# Patient Record
Sex: MALE | Race: BLACK OR AFRICAN AMERICAN | NOT HISPANIC OR LATINO | Employment: FULL TIME | URBAN - METROPOLITAN AREA
[De-identification: names, ages, dates, MRNs, and addresses within clinical notes are randomized per-mention and may not be internally consistent; named-entity substitution may affect disease eponyms.]

---

## 2023-10-15 PROBLEM — R00.2 PALPITATION: Status: ACTIVE | Noted: 2023-10-15

## 2023-10-15 PROBLEM — R29.818 SUSPECTED SLEEP APNEA: Status: ACTIVE | Noted: 2023-10-15

## 2023-10-15 PROBLEM — R04.0 EPISTAXIS: Status: ACTIVE | Noted: 2023-10-15

## 2023-10-15 PROBLEM — S83.419A GRADE 2 SPRAIN OF MEDIAL COLLATERAL LIGAMENT OF KNEE: Status: ACTIVE | Noted: 2023-10-15

## 2023-10-15 PROBLEM — S83.231D COMPLEX TEAR OF MEDIAL MENISCUS OF RIGHT KNEE, SUBSEQUENT ENCOUNTER: Status: ACTIVE | Noted: 2023-10-15

## 2023-10-15 PROBLEM — R06.83 SNORING: Status: ACTIVE | Noted: 2023-10-15

## 2023-10-15 PROBLEM — M79.89 RIGHT LEG SWELLING: Status: ACTIVE | Noted: 2023-10-15

## 2023-10-15 PROBLEM — M25.461 EFFUSION OF RIGHT KNEE: Status: ACTIVE | Noted: 2023-10-15

## 2023-10-15 PROBLEM — S83.511A RUPTURE OF ANTERIOR CRUCIATE LIGAMENT OF RIGHT KNEE: Status: ACTIVE | Noted: 2023-10-15

## 2023-10-15 PROBLEM — S83.209A CURRENT TEAR OF SEMILUNAR CARTILAGE: Status: ACTIVE | Noted: 2023-10-15

## 2023-10-15 PROBLEM — M76.50 PATELLAR TENDINITIS: Status: ACTIVE | Noted: 2023-10-15

## 2023-10-15 PROBLEM — S89.91XA RIGHT KNEE INJURY: Status: ACTIVE | Noted: 2023-10-15

## 2023-10-15 PROBLEM — R26.2 DIFFICULTY WALKING: Status: ACTIVE | Noted: 2023-10-15

## 2023-10-15 PROBLEM — S83.281A ACUTE LATERAL MENISCUS TEAR OF RIGHT KNEE: Status: ACTIVE | Noted: 2023-10-15

## 2023-10-15 PROBLEM — G47.8 SLEEP PARALYSIS: Status: ACTIVE | Noted: 2023-10-15

## 2023-10-15 PROBLEM — M23.8X1 CREPITUS OF JOINT OF RIGHT KNEE: Status: ACTIVE | Noted: 2023-10-15

## 2023-10-15 RX ORDER — FLUTICASONE PROPIONATE 50 MCG
SPRAY, SUSPENSION (ML) NASAL
COMMUNITY

## 2023-10-17 ENCOUNTER — OFFICE VISIT (OUTPATIENT)
Dept: PRIMARY CARE | Facility: CLINIC | Age: 23
End: 2023-10-17
Payer: COMMERCIAL

## 2023-10-17 VITALS
DIASTOLIC BLOOD PRESSURE: 72 MMHG | HEART RATE: 50 BPM | SYSTOLIC BLOOD PRESSURE: 120 MMHG | BODY MASS INDEX: 28.52 KG/M2 | OXYGEN SATURATION: 100 % | HEIGHT: 74 IN | RESPIRATION RATE: 18 BRPM | TEMPERATURE: 95.9 F | WEIGHT: 222.2 LBS

## 2023-10-17 DIAGNOSIS — Z23 NEED FOR IMMUNIZATION AGAINST INFLUENZA: ICD-10-CM

## 2023-10-17 DIAGNOSIS — E66.3 OVERWEIGHT: Primary | ICD-10-CM

## 2023-10-17 DIAGNOSIS — Z11.3 SCREENING FOR STD (SEXUALLY TRANSMITTED DISEASE): ICD-10-CM

## 2023-10-17 LAB
25(OH)D3 SERPL-MCNC: 26 NG/ML (ref 30–100)
ANION GAP SERPL CALC-SCNC: 10 MMOL/L (ref 10–20)
BUN SERPL-MCNC: 10 MG/DL (ref 6–23)
CALCIUM SERPL-MCNC: 10.3 MG/DL (ref 8.6–10.6)
CHLORIDE SERPL-SCNC: 102 MMOL/L (ref 98–107)
CHOLEST SERPL-MCNC: 209 MG/DL (ref 0–199)
CHOLESTEROL/HDL RATIO: 3.3
CO2 SERPL-SCNC: 31 MMOL/L (ref 21–32)
CREAT SERPL-MCNC: 1.26 MG/DL (ref 0.5–1.3)
EST. AVERAGE GLUCOSE BLD GHB EST-MCNC: 108 MG/DL
GFR SERPL CREATININE-BSD FRML MDRD: 81 ML/MIN/1.73M*2
GLUCOSE SERPL-MCNC: 86 MG/DL (ref 74–99)
HBA1C MFR BLD: 5.4 %
HDLC SERPL-MCNC: 63.4 MG/DL
LDLC SERPL CALC-MCNC: 134 MG/DL
NON HDL CHOLESTEROL: 146 MG/DL (ref 0–149)
POTASSIUM SERPL-SCNC: 4.3 MMOL/L (ref 3.5–5.3)
SODIUM SERPL-SCNC: 139 MMOL/L (ref 136–145)
T PALLIDUM AB SER QL: NONREACTIVE
TRIGL SERPL-MCNC: 59 MG/DL (ref 0–149)
TSH SERPL-ACNC: 1.56 MIU/L (ref 0.44–3.98)
VLDL: 12 MG/DL (ref 0–40)

## 2023-10-17 PROCEDURE — 99213 OFFICE O/P EST LOW 20 MIN: CPT | Performed by: NURSE PRACTITIONER

## 2023-10-17 PROCEDURE — 86780 TREPONEMA PALLIDUM: CPT | Performed by: NURSE PRACTITIONER

## 2023-10-17 PROCEDURE — 87661 TRICHOMONAS VAGINALIS AMPLIF: CPT | Performed by: NURSE PRACTITIONER

## 2023-10-17 PROCEDURE — 99203 OFFICE O/P NEW LOW 30 MIN: CPT | Performed by: NURSE PRACTITIONER

## 2023-10-17 PROCEDURE — 1036F TOBACCO NON-USER: CPT | Performed by: NURSE PRACTITIONER

## 2023-10-17 PROCEDURE — 84443 ASSAY THYROID STIM HORMONE: CPT | Mod: CMCLAB | Performed by: NURSE PRACTITIONER

## 2023-10-17 PROCEDURE — 87800 DETECT AGNT MULT DNA DIREC: CPT | Mod: CMCLAB | Performed by: NURSE PRACTITIONER

## 2023-10-17 PROCEDURE — 80048 BASIC METABOLIC PNL TOTAL CA: CPT | Performed by: NURSE PRACTITIONER

## 2023-10-17 PROCEDURE — 80061 LIPID PANEL: CPT | Performed by: NURSE PRACTITIONER

## 2023-10-17 PROCEDURE — 83036 HEMOGLOBIN GLYCOSYLATED A1C: CPT | Performed by: NURSE PRACTITIONER

## 2023-10-17 PROCEDURE — 87389 HIV-1 AG W/HIV-1&-2 AB AG IA: CPT | Performed by: NURSE PRACTITIONER

## 2023-10-17 PROCEDURE — 90686 IIV4 VACC NO PRSV 0.5 ML IM: CPT | Performed by: NURSE PRACTITIONER

## 2023-10-17 PROCEDURE — 36415 COLL VENOUS BLD VENIPUNCTURE: CPT | Performed by: NURSE PRACTITIONER

## 2023-10-17 PROCEDURE — 82306 VITAMIN D 25 HYDROXY: CPT | Mod: CMCLAB | Performed by: NURSE PRACTITIONER

## 2023-10-17 RX ORDER — IBUPROFEN 600 MG/1
TABLET ORAL
COMMUNITY
Start: 2022-11-03

## 2023-10-17 RX ORDER — OXYCODONE HYDROCHLORIDE 5 MG/1
TABLET ORAL
COMMUNITY
Start: 2022-11-03 | End: 2023-12-12 | Stop reason: ALTCHOICE

## 2023-10-17 RX ORDER — CETIRIZINE HYDROCHLORIDE 1 MG/ML
SOLUTION ORAL
COMMUNITY
Start: 2005-06-24 | End: 2023-12-12 | Stop reason: ALTCHOICE

## 2023-10-17 RX ORDER — BENZOCAINE .13; .15; .5; 2 G/100G; G/100G; G/100G; G/100G
GEL ORAL
COMMUNITY
Start: 2004-05-07 | End: 2023-12-12 | Stop reason: ALTCHOICE

## 2023-10-17 ASSESSMENT — COLUMBIA-SUICIDE SEVERITY RATING SCALE - C-SSRS
6. HAVE YOU EVER DONE ANYTHING, STARTED TO DO ANYTHING, OR PREPARED TO DO ANYTHING TO END YOUR LIFE?: NO
2. HAVE YOU ACTUALLY HAD ANY THOUGHTS OF KILLING YOURSELF?: NO
1. IN THE PAST MONTH, HAVE YOU WISHED YOU WERE DEAD OR WISHED YOU COULD GO TO SLEEP AND NOT WAKE UP?: NO

## 2023-10-17 ASSESSMENT — PATIENT HEALTH QUESTIONNAIRE - PHQ9
2. FEELING DOWN, DEPRESSED OR HOPELESS: NOT AT ALL
SUM OF ALL RESPONSES TO PHQ9 QUESTIONS 1 AND 2: 0
1. LITTLE INTEREST OR PLEASURE IN DOING THINGS: NOT AT ALL

## 2023-10-17 ASSESSMENT — ENCOUNTER SYMPTOMS
LOSS OF SENSATION IN FEET: 0
DEPRESSION: 0
OCCASIONAL FEELINGS OF UNSTEADINESS: 0

## 2023-10-17 ASSESSMENT — PAIN SCALES - GENERAL: PAINLEVEL: 2

## 2023-10-17 NOTE — PATIENT INSTRUCTIONS
Follow up in 9-12 months or sooner if needed.     Focus on the basics!   Aim to get adequate sleep.   Drink half your body weight in ounces of water daily.   I placed an order for nutrition to discuss diet plans. Please call the number provided for scheduling.     Today we completed blood work. We will contact you with any abnormalities from this testing.    Today, you received you flu shot.   You may experience mild redness and swelling at the injection site.       Call us with any questions or concerns.

## 2023-10-17 NOTE — PROGRESS NOTES
"Subjective   Gary Shultz is a 23 y.o. male who presents for Follow-up.  HPI  Mr. Shultz is a 24 yo M here today for re-establishment of care. Last seen in 2018.   States that he has been doing well generally. He is here today for a general health check up.   Reports that he is interest in optimizing his health and his weight. Current weight is 222, and he reports his goal weight around 200. He exercises frequently. He has not been able to lose weight and is seeking specific dietary guidance/ meal planning for weight loss and health.   Notes a family history of diabetes and is requesting screening today. Denies polydipsia, polyuria, headache, blurred vision, or lightheadedness  UTD with eye exam, last less than 1 year ago.   Would like to receive his flu vaccine today.   Notes some mild chronic low back pain exacerbations as well as intermittent RLQ pains. He does not take medication for discomfort. He is unsure about possible association with bowel movements. Typically sleeps on his stomach.       All systems reviewed. Review of systems negative except for noted positives in HPI    Objective     /72 (BP Location: Right arm, Patient Position: Sitting, BP Cuff Size: Adult)   Pulse 50   Temp 35.5 °C (95.9 °F)   Resp 18   Ht 1.88 m (6' 2\")   Wt 101 kg (222 lb 3.2 oz)   SpO2 100%   BMI 28.53 kg/m²    Vital signs noted and reviewed.       Physical Exam  Constitutional:       Appearance: Normal appearance.   Cardiovascular:      Rate and Rhythm: Normal rate and regular rhythm.   Pulmonary:      Effort: Pulmonary effort is normal. No respiratory distress.      Breath sounds: Normal breath sounds.   Skin:     General: Skin is warm and dry.   Neurological:      Mental Status: He is oriented to person, place, and time.   Psychiatric:         Mood and Affect: Mood normal.             Assessment/Plan   Problem List Items Addressed This Visit    None  Visit Diagnoses       Overweight    -  Primary    Relevant Orders "    Basic Metabolic Panel    Hemoglobin A1C    Lipid Panel    Vitamin D 25-Hydroxy,Total (for eval of Vitamin D levels)    TSH with reflex to Free T4 if abnormal    Referral to Nutrition Services    Screening for STD (sexually transmitted disease)        Relevant Orders    Syphilis Screen with Reflex    C. Trachomatis / N. Gonorrhoeae, Amplified Detection    TRICH VAGINALIS, AMPLIFIED    HIV 1/2 Antigen/Antibody Screen with Reflex to Confirmation    Need for immunization against influenza        Relevant Orders    Flu vaccine (IIV4) age 6 months and greater, preservative free

## 2023-10-18 LAB
C TRACH RRNA SPEC QL NAA+PROBE: NEGATIVE
HIV 1+2 AB+HIV1 P24 AG SERPL QL IA: NONREACTIVE
N GONORRHOEA DNA SPEC QL PROBE+SIG AMP: NEGATIVE
T VAGINALIS RRNA SPEC QL NAA+PROBE: NEGATIVE

## 2023-12-12 ENCOUNTER — APPOINTMENT (OUTPATIENT)
Dept: PRIMARY CARE | Facility: CLINIC | Age: 23
End: 2023-12-12

## 2023-12-12 ENCOUNTER — TELEMEDICINE (OUTPATIENT)
Dept: PRIMARY CARE | Facility: CLINIC | Age: 23
End: 2023-12-12
Payer: COMMERCIAL

## 2023-12-12 DIAGNOSIS — R35.0 FREQUENCY OF URINATION: Primary | ICD-10-CM

## 2023-12-12 PROCEDURE — 99212 OFFICE O/P EST SF 10 MIN: CPT | Performed by: FAMILY MEDICINE

## 2023-12-12 NOTE — PROGRESS NOTES
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--no ST-- no N,V,D--  Disoriented on Friday driving and went to  and had a (-) COVID test-also tested for GC/CT/trich and blood test-- all (-)--Also had STD check on 10/17/23 and it was all (-)--has been exclusive with GF for past 8 years    GF was diagnosed with COVID a week ago Sunday-- bad M-F then improved over weekend--he did not see her until yesterday-- she felt bad M-F then improved over weekend--he did not see her until yesterday--     VAXXED-- and 2 booster--    No pelvic pain or perineal pain or rectal pain--no h/o prostatitis  No abdominal pain  No TTP over bladder--     Fhx-- uncle had prostate CA--    ALL OTHER ROS (-)    General appearance:  Vitals available from patient?No    Alert, oriented, pleasant, in no apparent distress Yes  Answers questions appropriatelyYes  Eyes clear?No    Throat exam Not Available    Is patient in respiratory distressNo  Is patient coughing during consult:No  Audible wheezing noted? :No    Pt sounds congested?: No    Abdominal TTP by pt exam?:No    Psychiatric: Affect normalYes    Other relevant physical exam:    Pt location in [place] and consent obtained. Telemedicine appropriate evaluation completed. Appropriate physical exam done.    Urgency, dysuria, sl pelvic pain-- subjective fever, bad HA--- (-) COVID tests but + COVID exposure  Discussed that COVID has been known to cause LUTS as he is experiencing-- possible false (-) tests--  Will check urinalysis and urine culture--   Follow up with urologist if symptoms persist--

## 2023-12-14 ENCOUNTER — LAB (OUTPATIENT)
Dept: LAB | Facility: LAB | Age: 23
End: 2023-12-14
Payer: COMMERCIAL

## 2023-12-14 DIAGNOSIS — R35.0 FREQUENCY OF URINATION: ICD-10-CM

## 2023-12-14 LAB
APPEARANCE UR: CLEAR
BILIRUB UR STRIP.AUTO-MCNC: NEGATIVE MG/DL
COLOR UR: YELLOW
GLUCOSE UR STRIP.AUTO-MCNC: NEGATIVE MG/DL
KETONES UR STRIP.AUTO-MCNC: NEGATIVE MG/DL
LEUKOCYTE ESTERASE UR QL STRIP.AUTO: ABNORMAL
MUCOUS THREADS #/AREA URNS AUTO: ABNORMAL /LPF
NITRITE UR QL STRIP.AUTO: NEGATIVE
PH UR STRIP.AUTO: 7 [PH]
PROT UR STRIP.AUTO-MCNC: NEGATIVE MG/DL
RBC # UR STRIP.AUTO: NEGATIVE /UL
RBC #/AREA URNS AUTO: ABNORMAL /HPF
SP GR UR STRIP.AUTO: 1.02
UROBILINOGEN UR STRIP.AUTO-MCNC: 2 MG/DL
WBC #/AREA URNS AUTO: ABNORMAL /HPF

## 2023-12-14 PROCEDURE — 87086 URINE CULTURE/COLONY COUNT: CPT

## 2023-12-14 PROCEDURE — 81001 URINALYSIS AUTO W/SCOPE: CPT

## 2023-12-15 LAB — BACTERIA UR CULT: NO GROWTH

## 2023-12-17 NOTE — PATIENT INSTRUCTIONS
Please send me a [employer] message if you have any questions or concerns.  FOR NON URGENT questions only.  Allow up to 72 hours for response.    If you have prescription issues or other questions you can email   Edyta Butler  Digital Health Coordinator, at   [email or web address]     Urgency, dysuria, sl pelvic pain-- subjective fever, bad HA--- (-) COVID tests but + COVID exposure  Discussed that COVID has been known to cause LUTS as he is experiencing-- possible false (-) tests--  Will check urinalysis and urine culture--   Follow up with urologist if symptoms persist--     Rest and drink plenty of fluids    Tylenol and or motrin as needed for pain and fever (unless you have been told not to take these because of your personal medical history)    Discussed options and precautions:   Viral versus bacterial infection; use of medications; possible side effects; appropriate over-the-counter medications; possible complications and /or when to follow-up.    Follow-up in 1 to 2 days if not improving.  Follow-up immediately if symptoms worsen.    All red flags requiring in person care were discussed.  All patient's questions were answered.    Limitations to telemedicine include inability to do a complete and accurate physical exam.  Any concerns regarding this were conveyed with the patient and in person follow-up recommended if patient nature of illness does not progress as anticipated during this visit.

## 2023-12-19 ENCOUNTER — OFFICE VISIT (OUTPATIENT)
Dept: PRIMARY CARE | Facility: CLINIC | Age: 23
End: 2023-12-19
Payer: COMMERCIAL

## 2023-12-19 VITALS
TEMPERATURE: 97 F | HEIGHT: 75 IN | SYSTOLIC BLOOD PRESSURE: 113 MMHG | HEART RATE: 68 BPM | DIASTOLIC BLOOD PRESSURE: 72 MMHG | OXYGEN SATURATION: 98 % | BODY MASS INDEX: 27.35 KG/M2 | WEIGHT: 220 LBS | RESPIRATION RATE: 16 BRPM

## 2023-12-19 DIAGNOSIS — M79.10 MUSCLE TENSION PAIN: Primary | ICD-10-CM

## 2023-12-19 DIAGNOSIS — M79.10 MUSCLE TENSION PAIN: ICD-10-CM

## 2023-12-19 DIAGNOSIS — E55.9 VITAMIN D INSUFFICIENCY: ICD-10-CM

## 2023-12-19 PROCEDURE — 99214 OFFICE O/P EST MOD 30 MIN: CPT | Performed by: NURSE PRACTITIONER

## 2023-12-19 PROCEDURE — 1036F TOBACCO NON-USER: CPT | Performed by: NURSE PRACTITIONER

## 2023-12-19 RX ORDER — CHOLECALCIFEROL (VITAMIN D3) 50 MCG
50 TABLET ORAL DAILY
Qty: 30 TABLET | Refills: 11 | Status: SHIPPED | OUTPATIENT
Start: 2023-12-19 | End: 2023-12-19 | Stop reason: ENTERED-IN-ERROR

## 2023-12-19 RX ORDER — CYCLOBENZAPRINE HCL 5 MG
5 TABLET ORAL NIGHTLY PRN
Qty: 30 TABLET | Refills: 0 | Status: CANCELLED | OUTPATIENT
Start: 2023-12-19 | End: 2024-02-17

## 2023-12-19 RX ORDER — FLUTICASONE PROPIONATE 50 MCG
SPRAY, SUSPENSION (ML) NASAL
Qty: 16 G | Status: CANCELLED | OUTPATIENT
Start: 2023-12-19

## 2023-12-19 RX ORDER — CYCLOBENZAPRINE HCL 5 MG
5 TABLET ORAL NIGHTLY PRN
Qty: 30 TABLET | Refills: 0 | Status: SHIPPED | OUTPATIENT
Start: 2023-12-19 | End: 2023-12-19 | Stop reason: ENTERED-IN-ERROR

## 2023-12-19 ASSESSMENT — PAIN SCALES - GENERAL: PAINLEVEL: 5

## 2023-12-19 NOTE — PATIENT INSTRUCTIONS
Thank you for coming in for your visit today!    Please follow up in  12 weeks     START daily vitamin D supplementation.     Take Naprosyn every 12 hours around the clock to decrease inflammation.  Take the muscle relaxer to decrease spasms. This medication may cause drowsiness. Please do not operate machinery while taking this medication.    STRETCH!   Use heat and apply muscle rub prior to stretching.   Apply ice after stretching and while resting to decrease inflammation.   Soak in 2-3 cups of Epsom salt in the bathtub.      Call 911 or go to the emergency room if you have pain in your chest, difficulty breathing, or other life threatening symptoms.

## 2023-12-19 NOTE — PROGRESS NOTES
"Subjective   Gary hSultz is a 23 y.o. male who presents for Follow-up.  HPI  Mr. Shultz is here today with concern for recent headaches. Notes that over the last several weeks he has been experiencing an increase in headaches. He does note resolution when he takes Tylenol. Denies other associated symptoms like visual disturbances or sensitivity to sound. He does endorse bilateral trapezius tension. Denies eye strain. Does not feel the headache is associated with sleep patterns or eating.       Mild burning with urination. Recently seen in [facility] care. Urine culture negative.  Notes that it is improving. Would like to see urologist    Energy level is lower than he \"would like them to be\"   Notes that it can be worse after lunch.   Denies known pattern. Mildly insufficient vitamin D level noted. Open to trial of supplementation with close monitoring of patterns or associated factors.     All systems reviewed. Review of systems negative except for noted positives in HPI    Objective     /72   Pulse 68   Temp 36.1 °C (97 °F)   Resp 16   Ht 1.905 m (6' 3\")   Wt 99.8 kg (220 lb)   SpO2 98%   BMI 27.50 kg/m²    Vital signs noted and reviewed.       Physical Exam  Constitutional:       Appearance: Normal appearance.   Cardiovascular:      Rate and Rhythm: Normal rate and regular rhythm.   Pulmonary:      Effort: Pulmonary effort is normal. No respiratory distress.      Breath sounds: Normal breath sounds.   Musculoskeletal:      Cervical back: Swelling and tenderness present.   Skin:     General: Skin is warm and dry.   Neurological:      Mental Status: He is oriented to person, place, and time.   Psychiatric:         Mood and Affect: Mood normal.             Assessment/Plan   Problem List Items Addressed This Visit    None  Visit Diagnoses       Muscle tension pain    -  Primary    Vitamin D insufficiency                        " no

## 2023-12-21 ENCOUNTER — OFFICE VISIT (OUTPATIENT)
Dept: UROLOGY | Facility: CLINIC | Age: 23
End: 2023-12-21
Payer: COMMERCIAL

## 2023-12-21 VITALS
SYSTOLIC BLOOD PRESSURE: 111 MMHG | DIASTOLIC BLOOD PRESSURE: 67 MMHG | HEART RATE: 59 BPM | TEMPERATURE: 97.9 F | WEIGHT: 216.2 LBS | RESPIRATION RATE: 18 BRPM | BODY MASS INDEX: 27.02 KG/M2 | OXYGEN SATURATION: 99 %

## 2023-12-21 DIAGNOSIS — E55.9 VITAMIN D INSUFFICIENCY: ICD-10-CM

## 2023-12-21 DIAGNOSIS — R50.9 FEVER, UNSPECIFIED FEVER CAUSE: ICD-10-CM

## 2023-12-21 DIAGNOSIS — R35.0 URINARY FREQUENCY: Primary | ICD-10-CM

## 2023-12-21 DIAGNOSIS — M79.10 MUSCLE TENSION PAIN: ICD-10-CM

## 2023-12-21 DIAGNOSIS — R10.9 FLANK PAIN: ICD-10-CM

## 2023-12-21 PROBLEM — R39.15 URINARY URGENCY: Status: ACTIVE | Noted: 2023-12-21

## 2023-12-21 PROBLEM — R30.0 DYSURIA: Status: ACTIVE | Noted: 2023-12-21

## 2023-12-21 PROBLEM — R33.9 INCOMPLETE BLADDER EMPTYING: Status: ACTIVE | Noted: 2023-12-21

## 2023-12-21 LAB
POC APPEARANCE, URINE: CLEAR
POC BILIRUBIN, URINE: NEGATIVE
POC BLOOD, URINE: NEGATIVE
POC COLOR, URINE: YELLOW
POC GLUCOSE, URINE: NEGATIVE MG/DL
POC KETONES, URINE: NEGATIVE MG/DL
POC LEUKOCYTES, URINE: ABNORMAL
POC NITRITE,URINE: NEGATIVE
POC PH, URINE: 7.5 PH
POC PROTEIN, URINE: NEGATIVE MG/DL
POC SPECIFIC GRAVITY, URINE: 1.02
POC UROBILINOGEN, URINE: 1 EU/DL

## 2023-12-21 PROCEDURE — 51798 US URINE CAPACITY MEASURE: CPT | Mod: ZK | Performed by: PHYSICIAN ASSISTANT

## 2023-12-21 PROCEDURE — 81003 URINALYSIS AUTO W/O SCOPE: CPT | Mod: ZK | Performed by: PHYSICIAN ASSISTANT

## 2023-12-21 PROCEDURE — 1036F TOBACCO NON-USER: CPT | Performed by: PHYSICIAN ASSISTANT

## 2023-12-21 PROCEDURE — 99214 OFFICE O/P EST MOD 30 MIN: CPT | Mod: ZK | Performed by: PHYSICIAN ASSISTANT

## 2023-12-21 PROCEDURE — 51798 US URINE CAPACITY MEASURE: CPT | Performed by: PHYSICIAN ASSISTANT

## 2023-12-21 PROCEDURE — 81003 URINALYSIS AUTO W/O SCOPE: CPT | Performed by: PHYSICIAN ASSISTANT

## 2023-12-21 PROCEDURE — 99214 OFFICE O/P EST MOD 30 MIN: CPT | Performed by: PHYSICIAN ASSISTANT

## 2023-12-21 PROCEDURE — 87086 URINE CULTURE/COLONY COUNT: CPT | Performed by: PHYSICIAN ASSISTANT

## 2023-12-21 RX ORDER — CHOLECALCIFEROL (VITAMIN D3) 50 MCG
50 TABLET ORAL DAILY
Qty: 30 TABLET | Refills: 11 | Status: SHIPPED | OUTPATIENT
Start: 2023-12-21 | End: 2024-12-20

## 2023-12-21 RX ORDER — CYCLOBENZAPRINE HCL 5 MG
5 TABLET ORAL NIGHTLY PRN
Qty: 30 TABLET | Refills: 0 | Status: SHIPPED | OUTPATIENT
Start: 2023-12-21 | End: 2024-02-19

## 2023-12-21 SDOH — ECONOMIC STABILITY: FOOD INSECURITY: WITHIN THE PAST 12 MONTHS, THE FOOD YOU BOUGHT JUST DIDN'T LAST AND YOU DIDN'T HAVE MONEY TO GET MORE.: NEVER TRUE

## 2023-12-21 SDOH — ECONOMIC STABILITY: FOOD INSECURITY: WITHIN THE PAST 12 MONTHS, YOU WORRIED THAT YOUR FOOD WOULD RUN OUT BEFORE YOU GOT MONEY TO BUY MORE.: NEVER TRUE

## 2023-12-21 ASSESSMENT — PAIN SCALES - GENERAL: PAINLEVEL: 0-NO PAIN

## 2023-12-21 ASSESSMENT — ENCOUNTER SYMPTOMS
ALLERGIC/IMMUNOLOGIC NEGATIVE: 1
LOSS OF SENSATION IN FEET: 0
DEPRESSION: 0
RESPIRATORY NEGATIVE: 1
NEUROLOGICAL NEGATIVE: 1
PSYCHIATRIC NEGATIVE: 1
HEMATOLOGIC/LYMPHATIC NEGATIVE: 1
EYES NEGATIVE: 1
CONSTITUTIONAL NEGATIVE: 1
ENDOCRINE NEGATIVE: 1
GASTROINTESTINAL NEGATIVE: 1
MUSCULOSKELETAL NEGATIVE: 1
CARDIOVASCULAR NEGATIVE: 1
OCCASIONAL FEELINGS OF UNSTEADINESS: 0

## 2023-12-21 NOTE — PROGRESS NOTES
Subjective   Patient ID: Gary Shultz is a 23 y.o. male who presents for No chief complaint on file..  ALISA    Pt is a 24 yo male with new onset dysuria, suprapubic pain, right flank pain radiating to right lower abdomen, increased urinary frequency, urgency, intermittent fevers with sweats and headaches.   He reports the burning has subsided over the last few days.   He had a urine culture and STD evaluation and was negative.     He denies gross hematuria. He denies nausea or vomiting. He denies penile discharge or peritoneal pain.     UA trace of leuks        Review of Systems   Constitutional: Negative.    HENT: Negative.     Eyes: Negative.    Respiratory: Negative.     Cardiovascular: Negative.    Gastrointestinal: Negative.    Endocrine: Negative.    Genitourinary: Negative.    Musculoskeletal: Negative.    Skin: Negative.    Allergic/Immunologic: Negative.    Neurological: Negative.    Hematological: Negative.    Psychiatric/Behavioral: Negative.         Objective   Physical Exam  Constitutional:       General: He is not in acute distress.     Appearance: Normal appearance.   HENT:      Head: Normocephalic and atraumatic.      Nose: Nose normal.      Mouth/Throat:      Mouth: Mucous membranes are moist.   Cardiovascular:      Rate and Rhythm: Normal rate.   Pulmonary:      Effort: Pulmonary effort is normal.   Abdominal:      General: Abdomen is flat.      Palpations: Abdomen is soft.   Genitourinary:     Penis: Normal.       Testes: Normal.      Prostate: Normal.   Musculoskeletal:      Cervical back: Normal range of motion.   Neurological:      Mental Status: He is alert.         Assessment/Plan   Problem List Items Addressed This Visit             ICD-10-CM    Flank pain R10.9    Relevant Orders    CT abdomen pelvis wo IV contrast    Urinary frequency - Primary R35.0    Relevant Orders    POCT UA (nonautomated) manually resulted (Completed)    CT abdomen pelvis wo IV contrast     Other Visit Diagnoses          Codes    Fever, unspecified fever cause     R50.9    Relevant Orders    CT abdomen pelvis wo IV contrast             Urine sent for culture. I advised proceeding with a CT stone protocole to evaluate for ureteral stone especially since he has been having intermittent flank pain and fevers.     Follow up in 3-4 weeks.     Corwin Carvalho PA-C 12/21/23 10:57 AM

## 2023-12-22 LAB — BACTERIA UR CULT: NO GROWTH

## 2023-12-26 ENCOUNTER — NUTRITION (OUTPATIENT)
Dept: NUTRITION | Facility: HOSPITAL | Age: 23
End: 2023-12-26
Payer: COMMERCIAL

## 2023-12-26 DIAGNOSIS — Z71.3 DIETARY COUNSELING: Primary | ICD-10-CM

## 2023-12-26 DIAGNOSIS — E66.3 OVERWEIGHT: ICD-10-CM

## 2023-12-26 PROCEDURE — 97802 MEDICAL NUTRITION INDIV IN: CPT | Performed by: DIETITIAN, REGISTERED

## 2023-12-26 NOTE — PROGRESS NOTES
Reason for Nutrition Visit:  Pt is a 23 y.o. male being seen at [facility] referred for   1. Dietary counseling        2. Overweight  Referral to Nutrition Services         Nutrition Assessment      Past Medical Hx:  Patient Active Problem List   Diagnosis    Acute lateral meniscus tear of right knee    Crepitus of joint of right knee    Difficulty walking    Epistaxis    Grade 2 sprain of medial collateral ligament of knee    Palpitation    Patellar tendinitis    Current tear of semilunar cartilage    Effusion of right knee    Right knee injury    Rupture of anterior cruciate ligament of right knee    Right leg swelling    Sleep paralysis    Snoring    Suspected sleep apnea    Complex tear of medial meniscus of right knee, subsequent encounter    Flank pain    Dysuria    Urinary frequency    Urinary urgency    Incomplete bladder emptying    Overweight     Lab Results   Component Value Date    HGBA1C 5.4 10/17/2023    CHOL 209 (H) 10/17/2023    LDLCALC 134 (H) 10/17/2023    TRIG 59 10/17/2023    HDL 63.4 10/17/2023      Food and Nutrient History: Pt says that he is concerned about gut health with bloating. He can bloat usually after eating or even if he hasn't ate in several hours will notice it. He only eats about 2-3 meals per day, but reports that they can be big portions and wants to reduce these. He interested in losing weight. Both of his parents have DM and HTN. His cholesterol was slightly elevated. He does eat breakfast. normally a breakfast sandwich with sausage and hall, and half of the time eats lunch at work. Dinner is consistent. He snacks most of the day because they are available at work. 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 He tells that his energy drops multiple times per day like 11-12pm and 3pm.     Dietary Recall:  Meal 1: 8am 3-4 slices on white bread sometimes with an apple  Meal 2: Rebel-bowl with vegetables-broccoli sprouts, veggie  mixes, yum yum sauce, chicken, rice  Meal 3: 7:30-9pm few pcs of pizza; sub sandwich; chicken tenders and fries; dinner at home might be chicken and rice  Snacks: granola bars, chips, cookies, donuts at work; chews Extra gum regularly throughout the day  Fluid Intake: 120 oz water, lemonade 3x per week, 1 cup of coffee sometimes, 1/2 lori mixer bottle maybe 1-2 per weekends    Appetite: Good     Food Allergy: n/a  Food Intolerance: possible lactose sensivity  GI Symptoms: increased gas, constipation, reflux GI Symptoms greater than 2 weeks: yes  Oral Problems: denies  Dentition: own  Sleep Duration/Quality: 5-6 hrs disrupted, 7+ hrs disrupted  Cooking: Patient, Family  Grocery Shopping: Patient, Family  Dining Out: Everyday    Vitamin Intake: D   Nutrition-Related Complementary/Alternative Medicine Use: Seamoss gel-black seed oil    Physical Activity History: Works out with strength, cardio and plays basketball/drills at least 2x per week; works for the [employer] and uses their gym     Food Preparation  Cooking: Patient, Family  Grocery Shopping: Patient, Family  Dining Out: Everyday    Feelings of Hunger?: Yes and will eat  Physical Feeling: Stuffed  Binging: Frequently  Cravings: Sweet  Energy Levels: Low    Nutrition Focused Physical Exam:    Performed/Deferred: Deferred as pt visually appears well-nourished with no signs of malnutrition    Muscle Wasting:  Temporalis: Defer  Pectoralis (Clavicular Region): Defer  Deltoid/Trapezius: Defer  Interosseous: Defer  Quadriceps: Defer    Loss of Subcutaneous Fat:  Orbital Fat Pads: Defer  Buccal Fat Pads: Defer  Triceps: Defer  Ribs: Defer    Other Physical Findings:  Hair: Negative  Eyes: Negative  Mouth: Negative  Skin: Negative  Nails: Negative    Estimated Energy Needs:    Total Energy Estimated Needs (kCal): 2210 kCal   Method for Estimating Needs: MSJ: 2050x1.2-250   Total Protein Estimated Needs (g): 98.18 g   Total Protein Estimated Needs (g/kg): 1  g/kg  Nutrition Diagnosis     Patient has Nutrition Diagnosis: Yes Diagnosis Status (1): New  Nutrition Diagnosis 1: Altered GI function Related to (1): alteration in GI tract function As Evidenced by (1): reports of excessive bloating, gas and occasional constipation.     Diagnosis Status (2): New Nutrition Diagnosis 2: Food and nutrition related knowledge deficit  Nutrition Diagnosis 2: Food and nutrition related knowledge deficit Related to (2): lack of or limited prior nutrition-related education As Evidenced by (2): no prior knowledge of need for food- and nutrition-related recommendations.     Diagnosis Status (3): New Nutrition Diagnosis 3: Inadequate fiber intake  Nutrition Diagnosis 3: Inadequate fiber intake Related to (3): food and nutrition related knowledge deficit concerning desirable quantities of fiber As Evidenced by (3): estimated intake of fiber that is insufficient when compared to recommended amounts (38 g/day for men and 25 g/day for women).     Nutrition Interventions/Recommendations   Individualized Nutrition Prescription Provided for : Low FODMAP diet  Meals & Snacks: Fiber-modified diet, Modify Composition of Meals/Snacks, Modify schedule of foods/fluids  Goals: Consistent meal/snack pattern with adherance to the low FODMAP diet and modifications to eating times throughout the day    Strategies: Nutrition counseling based on motivational interviewing strategy, Nutrition counseling based on goal setting strategy    Nutrition Monitoring and Evaluation   Monitoring and Evaluation Plan: Meal/snack pattern, Protein intake Meal/Snack Pattern: Estimated meal and snack pattern, Food variety  Monitoring and Evaluation Plan: Food and nutrition knowledge Criteria: Ability to choose healthful foods that increase satiety and physical fullness  Monitoring and Evaluation Plan: GI profile        Nutrition Recommendations:  Via teach back method patient verbalized understanding of the following topics:  1)  Recommended pt try the low FODMAP diet to reduce symptoms of bloating, gas and constipation. After following for 4-6 weeks, pt will complete the reintroduction period to identify if specific FODMAPs contribute to symptoms the most. Once that period is completed, we can discuss foods to enhance gut health for better tolerance of all foods without GI symptoms.     Educational Handouts: Low FODMAP Toolkit    Readiness to Change : Fair  Level of Understanding : Fair  Anticipated Compliant : Fair

## 2023-12-27 VITALS — HEIGHT: 75 IN | BODY MASS INDEX: 27.02 KG/M2

## 2023-12-27 NOTE — PATIENT INSTRUCTIONS
1) FODMAPs stands for fermentable, oligosaccharides, disaccharides, monosaccharides and polyols. These are fibers and sugars found in foods and are often found to cause GI discomfort including bloating, abdominal pain, diarrhea and constipation. When following a low FODMAPs diet you will be eliminating these foods from the diet for about 4 to 6 weeks. After eating low FODMAPs, you will slowly introduce each food or re-challenge the food to see if it cause your GI symptoms.  2) Fructose is found in honey and high fructose corn syrup. Fruit contains fructose and some varieties have higher amounts including apples, pears, cherries, peaches, prunes, apricots, mangoes, watermelon, and blackberries. Fruit can still be consumed with low FODMAPs in small portions such as ½ small banana, one small jamel, or ½ berries or melon. Table sugar, 100% maple syrup and stevia can be used as sweeteners.  3) Oligosaccharides refer to wheat or rye grain products, onions, garlic, ketchup, raisins, beans, nuts, seeds, and certain seasonings. Gluten-free grain products can be consumed. Small portions or 2 tablespoons of nuts and nut butters can be tolerated. Garlic-infused oils, chives, scallions, leafy herbs, nash, mustard and sweet spices can be used. Chickpeas, green beans and edamame may be eaten in ½ cup portions.  4) Disaccharides include lactose, which is a sugar naturally found in milk. Regular milk products including yogurt and ice cream should not be eaten. Lactose-free, plant based or kefir milk products should be used instead. Soft cheeses like cottage or ricotta cheese will have higher amounts of lactose. Aged cheeses including cheddar, swiss, Rj cameron, brie, parmesan, and Havarti are low FODMAP.   5) Polyols are fibers found in certain vegetables and sugar alcohols. Broccoli, cauliflower, Brussel sprouts, mushrooms, corn, asparagus, beets and celery should be avoided. Leafy green vegetables, tomatoes, cucumbers,  summer squash, carrots, bell peppers, and white potatoes do not have polyols. Sugar free gums and candies that use sugar alcohols (i.e. mannitol, xylitol, and maltitol) cannot be used.  6) After following low FODMAPs for 4 to 6 weeks, you will introduce one food at a time. On this first day choose to eat a small portion of a food from one of the FODMAP groups. The next day eat several large portions of foods from this group. An example would be start with  oligosaccharides and have 1 slice of wheat bread. Then gradually eat more  oligosaccharides foods over the next week several times a day. Track your GI symptoms to assess if  oligosaccharides is a trigger food. Once you have tried that food for a week, return to eating low FODMAPs  diet for a few days then try challenging another FODMAP group.

## 2024-01-04 ENCOUNTER — HOSPITAL ENCOUNTER (OUTPATIENT)
Dept: RADIOLOGY | Facility: HOSPITAL | Age: 24
Discharge: HOME | End: 2024-01-04
Payer: COMMERCIAL

## 2024-01-04 DIAGNOSIS — R50.9 FEVER, UNSPECIFIED FEVER CAUSE: ICD-10-CM

## 2024-01-04 DIAGNOSIS — R35.0 URINARY FREQUENCY: ICD-10-CM

## 2024-01-04 DIAGNOSIS — R10.9 FLANK PAIN: ICD-10-CM

## 2024-01-04 PROCEDURE — 74176 CT ABD & PELVIS W/O CONTRAST: CPT

## 2024-01-04 PROCEDURE — 74176 CT ABD & PELVIS W/O CONTRAST: CPT | Performed by: RADIOLOGY

## 2024-01-22 ENCOUNTER — APPOINTMENT (OUTPATIENT)
Dept: UROLOGY | Facility: CLINIC | Age: 24
End: 2024-01-22
Payer: COMMERCIAL

## 2024-01-25 ENCOUNTER — APPOINTMENT (OUTPATIENT)
Dept: UROLOGY | Facility: CLINIC | Age: 24
End: 2024-01-25
Payer: COMMERCIAL

## 2024-02-13 ENCOUNTER — APPOINTMENT (OUTPATIENT)
Dept: NUTRITION | Facility: HOSPITAL | Age: 24
End: 2024-02-13
Payer: COMMERCIAL

## 2024-03-04 ENCOUNTER — APPOINTMENT (OUTPATIENT)
Dept: RADIOLOGY | Facility: HOSPITAL | Age: 24
End: 2024-03-04
Payer: COMMERCIAL

## 2024-03-04 ENCOUNTER — HOSPITAL ENCOUNTER (EMERGENCY)
Facility: HOSPITAL | Age: 24
Discharge: HOME | End: 2024-03-04
Payer: COMMERCIAL

## 2024-03-04 VITALS
OXYGEN SATURATION: 99 % | SYSTOLIC BLOOD PRESSURE: 120 MMHG | DIASTOLIC BLOOD PRESSURE: 61 MMHG | WEIGHT: 215 LBS | RESPIRATION RATE: 18 BRPM | HEIGHT: 75 IN | TEMPERATURE: 99.3 F | HEART RATE: 84 BPM | BODY MASS INDEX: 26.73 KG/M2

## 2024-03-04 DIAGNOSIS — S83.92XA SPRAIN OF LEFT KNEE, UNSPECIFIED LIGAMENT, INITIAL ENCOUNTER: Primary | ICD-10-CM

## 2024-03-04 PROCEDURE — 73590 X-RAY EXAM OF LOWER LEG: CPT | Mod: LT

## 2024-03-04 PROCEDURE — 73590 X-RAY EXAM OF LOWER LEG: CPT | Mod: LEFT SIDE | Performed by: RADIOLOGY

## 2024-03-04 PROCEDURE — 73564 X-RAY EXAM KNEE 4 OR MORE: CPT | Mod: LT

## 2024-03-04 PROCEDURE — 99284 EMERGENCY DEPT VISIT MOD MDM: CPT | Performed by: PHYSICIAN ASSISTANT

## 2024-03-04 PROCEDURE — 99284 EMERGENCY DEPT VISIT MOD MDM: CPT

## 2024-03-04 PROCEDURE — 73564 X-RAY EXAM KNEE 4 OR MORE: CPT | Mod: LEFT SIDE | Performed by: RADIOLOGY

## 2024-03-04 RX ORDER — IBUPROFEN 600 MG/1
600 TABLET ORAL EVERY 6 HOURS PRN
Qty: 30 TABLET | Refills: 0 | Status: SHIPPED | OUTPATIENT
Start: 2024-03-04

## 2024-03-04 RX ORDER — ACETAMINOPHEN 325 MG/1
650 TABLET ORAL EVERY 6 HOURS PRN
Qty: 30 TABLET | Refills: 0 | Status: SHIPPED | OUTPATIENT
Start: 2024-03-04

## 2024-03-04 ASSESSMENT — PAIN DESCRIPTION - ORIENTATION: ORIENTATION: RIGHT

## 2024-03-04 ASSESSMENT — PAIN DESCRIPTION - PAIN TYPE: TYPE: ACUTE PAIN

## 2024-03-04 ASSESSMENT — COLUMBIA-SUICIDE SEVERITY RATING SCALE - C-SSRS
2. HAVE YOU ACTUALLY HAD ANY THOUGHTS OF KILLING YOURSELF?: NO
1. IN THE PAST MONTH, HAVE YOU WISHED YOU WERE DEAD OR WISHED YOU COULD GO TO SLEEP AND NOT WAKE UP?: NO
6. HAVE YOU EVER DONE ANYTHING, STARTED TO DO ANYTHING, OR PREPARED TO DO ANYTHING TO END YOUR LIFE?: NO

## 2024-03-04 ASSESSMENT — PAIN SCALES - GENERAL: PAINLEVEL_OUTOF10: 5 - MODERATE PAIN

## 2024-03-04 ASSESSMENT — PAIN DESCRIPTION - LOCATION: LOCATION: KNEE

## 2024-03-04 ASSESSMENT — PAIN - FUNCTIONAL ASSESSMENT: PAIN_FUNCTIONAL_ASSESSMENT: 0-10

## 2024-03-04 NOTE — ED PROVIDER NOTES
HPI:  23-year-old male otherwise healthy presents complaining of left knee pain.  States that last Tuesday he feels that he hyperextended it and then again possibly yesterday.  States the pain is on the outside surface.  Has been able to walk on it despite the pain.  Has not taken anything for the pain.  Denies any weakness or paresthesias.    Physical Exam:   GEN: Vitals noted. NAD  EYES:  EOMs grossly intact, anicteric sclera  NATALIA: Mucosa moist.  NECK: Supple.  CARD: RRR  PULMONARY: Moving air well. Clear all lung fields.  ABDOMEN: Soft, no guarding, no rigidity. Nontender. NABS  EXTREMITIES: Full ROM, no pitting edema, mild diffuse tenderness worse in the lateral aspect, negative anterior posterior drawer test, negative varus valgus stress test, no erythema ecchymosis swelling warmth or deformity.  2+ pedal pulses with brisk cap refill with intact sensation strength throughout the extremity  SKIN: Intact, warm and dry  NEURO: Alert and oriented x 3, speech is clear, no obvious deficits noted.       ----------------------------------------------------------------------------------------------------------------------------    MDM:  23-year-old male presenting for left knee pain x 1 week.  On exam he is well-appearing ambulating with only slight discomfort.  Vital signs stable.  He has minimal tenderness on exam.  No overlying skin change, negative ligamentous test, he is neurovascularly intact.  He declined any analgesics at this time.  Will perform plain film x-rays.  X-ray independent rotation without fracture, radiology report with small effusion otherwise unremarkable.  He is discharged home with Tylenol ibuprofen.  To follow-up with Ortho.  Return precautions reviewed.    Diagnoses as of 03/04/24 1015   Sprain of left knee, unspecified ligament, initial encounter     XR tibia fibula left 2 views   Final Result   1. Small joint effusion. Otherwise, unremarkable left knee   radiographs.   2. No acute fracture or  malalignment of the left tibia or fibula.        MACRO:   None.        Signed by: Isis Murrell 3/4/2024 10:04 AM   Dictation workstation:   [id number]      XR knee left 4+ views   Final Result   1. Small joint effusion. Otherwise, unremarkable left knee   radiographs.   2. No acute fracture or malalignment of the left tibia or fibula.        MACRO:   None.        Signed by: Iiss Murrell 3/4/2024 10:04 AM   Dictation workstation:   [id number]        Labs Reviewed - No data to display    ----------------------------------------------------------------------------------------------------------------------------    This note was dictated using a speech recognition program.  While an attempt was made at proof reading to minimize errors, minor errors in transcription may be present call for questions.     Christofer Morales PA-C  03/04/24 1014

## 2024-03-04 NOTE — DISCHARGE INSTRUCTIONS
Your x-ray showed no fracture.  You may have sprained your knee.  For persistent pain follow-up with the orthopedist, call [phone] to schedule an appointment.  Perform RICE: Rest, ice, compression, elevation

## 2024-03-04 NOTE — Clinical Note
Gary Shultz was seen and treated in our emergency department on 3/4/2024.  He may return to work on 03/06/2024.       If you have any questions or concerns, please don't hesitate to call.      Christofer Morales PA-C

## 2024-03-19 ENCOUNTER — OFFICE VISIT (OUTPATIENT)
Dept: ORTHOPEDIC SURGERY | Facility: CLINIC | Age: 24
End: 2024-03-19
Payer: COMMERCIAL

## 2024-03-19 DIAGNOSIS — M25.562 ACUTE PAIN OF LEFT KNEE: ICD-10-CM

## 2024-03-19 PROCEDURE — 99214 OFFICE O/P EST MOD 30 MIN: CPT | Performed by: ORTHOPAEDIC SURGERY

## 2024-03-19 PROCEDURE — 1036F TOBACCO NON-USER: CPT | Performed by: ORTHOPAEDIC SURGERY

## 2024-04-02 ENCOUNTER — HOSPITAL ENCOUNTER (OUTPATIENT)
Dept: RADIOLOGY | Facility: HOSPITAL | Age: 24
Discharge: HOME | End: 2024-04-02
Payer: COMMERCIAL

## 2024-04-02 DIAGNOSIS — M25.562 ACUTE PAIN OF LEFT KNEE: ICD-10-CM

## 2024-04-02 PROCEDURE — 73721 MRI JNT OF LWR EXTRE W/O DYE: CPT | Mod: LEFT SIDE | Performed by: RADIOLOGY

## 2024-04-02 PROCEDURE — 73721 MRI JNT OF LWR EXTRE W/O DYE: CPT | Mod: LT

## 2025-03-13 ENCOUNTER — APPOINTMENT (OUTPATIENT)
Dept: PRIMARY CARE | Facility: CLINIC | Age: 25
End: 2025-03-13
Payer: COMMERCIAL

## 2025-03-19 ENCOUNTER — OFFICE VISIT (OUTPATIENT)
Dept: PRIMARY CARE | Facility: CLINIC | Age: 25
End: 2025-03-19
Payer: COMMERCIAL

## 2025-03-19 VITALS
HEIGHT: 75 IN | SYSTOLIC BLOOD PRESSURE: 132 MMHG | DIASTOLIC BLOOD PRESSURE: 71 MMHG | TEMPERATURE: 97.1 F | HEART RATE: 61 BPM | BODY MASS INDEX: 28.51 KG/M2 | OXYGEN SATURATION: 97 % | WEIGHT: 229.3 LBS

## 2025-03-19 DIAGNOSIS — Z11.3 SCREENING FOR STD (SEXUALLY TRANSMITTED DISEASE): Primary | ICD-10-CM

## 2025-03-19 DIAGNOSIS — M54.2 CHRONIC NECK PAIN: ICD-10-CM

## 2025-03-19 DIAGNOSIS — G47.9 SLEEP DISTURBANCE: ICD-10-CM

## 2025-03-19 DIAGNOSIS — E66.3 OVERWEIGHT: ICD-10-CM

## 2025-03-19 DIAGNOSIS — G89.29 CHRONIC NECK PAIN: ICD-10-CM

## 2025-03-19 PROCEDURE — 99215 OFFICE O/P EST HI 40 MIN: CPT | Performed by: NURSE PRACTITIONER

## 2025-03-19 PROCEDURE — 3008F BODY MASS INDEX DOCD: CPT | Performed by: NURSE PRACTITIONER

## 2025-03-19 PROCEDURE — 36415 COLL VENOUS BLD VENIPUNCTURE: CPT | Performed by: NURSE PRACTITIONER

## 2025-03-19 SDOH — ECONOMIC STABILITY: FOOD INSECURITY: WITHIN THE PAST 12 MONTHS, THE FOOD YOU BOUGHT JUST DIDN'T LAST AND YOU DIDN'T HAVE MONEY TO GET MORE.: NEVER TRUE

## 2025-03-19 SDOH — ECONOMIC STABILITY: FOOD INSECURITY: WITHIN THE PAST 12 MONTHS, YOU WORRIED THAT YOUR FOOD WOULD RUN OUT BEFORE YOU GOT MONEY TO BUY MORE.: NEVER TRUE

## 2025-03-19 ASSESSMENT — ENCOUNTER SYMPTOMS
LOSS OF SENSATION IN FEET: 0
OCCASIONAL FEELINGS OF UNSTEADINESS: 0
DEPRESSION: 0

## 2025-03-19 ASSESSMENT — PAIN SCALES - GENERAL: PAINLEVEL_OUTOF10: 0-NO PAIN

## 2025-03-19 ASSESSMENT — LIFESTYLE VARIABLES: HOW OFTEN DO YOU HAVE A DRINK CONTAINING ALCOHOL: NEVER

## 2025-03-19 ASSESSMENT — PATIENT HEALTH QUESTIONNAIRE - PHQ9
SUM OF ALL RESPONSES TO PHQ9 QUESTIONS 1 & 2: 0
2. FEELING DOWN, DEPRESSED OR HOPELESS: NOT AT ALL
1. LITTLE INTEREST OR PLEASURE IN DOING THINGS: NOT AT ALL

## 2025-03-19 NOTE — PATIENT INSTRUCTIONS
Thank you for coming in for your visit today!    Please follow up in 6 months or sooner if needed.    Today we completed blood work. We will contact you with any abnormalities from this testing.    Continue with routine exercise   Develop a sleep routine.   Do your best to avoid screens for 30-60 minutes before bedtime.     Call 911 or go to the emergency room if you have pain in your chest, difficulty breathing, or other life threatening symptoms.

## 2025-03-19 NOTE — PROGRESS NOTES
"Subjective   Gary Shultz is a 24 y.o. male who presents for Annual Exam (Physical ).  HPI  Mr. Shultz is a 25 yo M here today for physical.   Denies recent changes with his health.  He notes that he feels like his weight is possibly fluctuating.   Notes that he feels \"lighter\" but then the scale states that he is gaining weight.   He is exercising at least 3x per week. Doing [employer] work out, notes that this may be contributing.     He notes that his diet has improved over the last 2 years.  Increased green vegetables, fruits.     Neck problem from 2018- had an injury while playing basketball.  He note that at the time he felt 2 \"pops\" one behind his R ear and the other at the base of his neck.  He feels that its controlled right now but that it does exacerbate if he moves \"the wrong way\"  He has a neck roller from [employer] that helps greatly  Feels better after lifting that focuses on his upper body.     Sleep just recently improved.  This last week he has been sleeping through the night without issue  Prior to this he would wake up 2-3x throughout the night.   Notes that he will leave the TV on from time to time and then he will have more wake ups   All systems reviewed. Review of systems negative except for noted positives in HPI    Objective     /71 (BP Location: Right arm, Patient Position: Sitting, BP Cuff Size: Large adult)   Pulse 61   Temp 36.2 °C (97.1 °F) (Skin)   Ht 1.905 m (6' 3\")   Wt 104 kg (229 lb 4.8 oz)   SpO2 97%   BMI 28.66 kg/m²    Vital signs noted and reviewed.       Physical Exam  Constitutional:       Appearance: Normal appearance.   Cardiovascular:      Rate and Rhythm: Normal rate and regular rhythm.   Pulmonary:      Effort: Pulmonary effort is normal. No respiratory distress.      Breath sounds: Normal breath sounds.   Skin:     General: Skin is warm and dry.   Neurological:      Mental Status: He is oriented to person, place, and time.   Psychiatric:         Mood and Affect: Mood " normal.             Assessment/Plan   Problem List Items Addressed This Visit       Overweight    Relevant Orders    Comprehensive Metabolic Panel    CBC    Hemoglobin A1C    TSH with reflex to Free T4 if abnormal    Vitamin D 25-Hydroxy,Total (for eval of Vitamin D levels)     Other Visit Diagnoses       Screening for STD (sexually transmitted disease)    -  Primary    Relevant Orders    Syphilis Screen with Reflex    C. trachomatis / N. gonorrhoeae, Amplified, Urogenital    Trichomonas vaginalis, Amplified    HIV 1/2 Antigen/Antibody Screen with Reflex to Confirmation    Sleep disturbance        Relevant Orders    Comprehensive Metabolic Panel    CBC    Hemoglobin A1C    TSH with reflex to Free T4 if abnormal    Vitamin D 25-Hydroxy,Total (for eval of Vitamin D levels)    Chronic neck pain            Stretching, will consider PT.

## 2025-03-20 LAB
25(OH)D3+25(OH)D2 SERPL-MCNC: 19 NG/ML (ref 30–100)
ALBUMIN SERPL-MCNC: 4.7 G/DL (ref 3.6–5.1)
ALP SERPL-CCNC: 52 U/L (ref 36–130)
ALT SERPL-CCNC: 24 U/L (ref 9–46)
ANION GAP SERPL CALCULATED.4IONS-SCNC: 12 MMOL/L (CALC) (ref 7–17)
AST SERPL-CCNC: 21 U/L (ref 10–40)
BILIRUB SERPL-MCNC: 0.7 MG/DL (ref 0.2–1.2)
BUN SERPL-MCNC: 12 MG/DL (ref 7–25)
C TRACH RRNA SPEC QL NAA+PROBE: NOT DETECTED
CALCIUM SERPL-MCNC: 9.6 MG/DL (ref 8.6–10.3)
CHLORIDE SERPL-SCNC: 102 MMOL/L (ref 98–110)
CO2 SERPL-SCNC: 26 MMOL/L (ref 20–32)
CREAT SERPL-MCNC: 1.44 MG/DL (ref 0.6–1.24)
EGFRCR SERPLBLD CKD-EPI 2021: 69 ML/MIN/1.73M2
ERYTHROCYTE [DISTWIDTH] IN BLOOD BY AUTOMATED COUNT: 13.1 % (ref 11–15)
EST. AVERAGE GLUCOSE BLD GHB EST-MCNC: 111 MG/DL
EST. AVERAGE GLUCOSE BLD GHB EST-SCNC: 6.2 MMOL/L
GLUCOSE SERPL-MCNC: 86 MG/DL (ref 65–99)
HBA1C MFR BLD: 5.5 % OF TOTAL HGB
HCT VFR BLD AUTO: 47.7 % (ref 38.5–50)
HGB BLD-MCNC: 15.6 G/DL (ref 13.2–17.1)
HIV 1+2 AB+HIV1 P24 AG SERPL QL IA: NORMAL
MCH RBC QN AUTO: 31.4 PG (ref 27–33)
MCHC RBC AUTO-ENTMCNC: 32.7 G/DL (ref 32–36)
MCV RBC AUTO: 96 FL (ref 80–100)
N GONORRHOEA RRNA SPEC QL NAA+PROBE: NOT DETECTED
PLATELET # BLD AUTO: 208 THOUSAND/UL (ref 140–400)
PMV BLD REES-ECKER: 10.6 FL (ref 7.5–12.5)
POTASSIUM SERPL-SCNC: 4.5 MMOL/L (ref 3.5–5.3)
PROT SERPL-MCNC: 7.1 G/DL (ref 6.1–8.1)
QUEST GC CT AMPLIFIED (ALWAYS MESSAGE): NORMAL
RBC # BLD AUTO: 4.97 MILLION/UL (ref 4.2–5.8)
SODIUM SERPL-SCNC: 140 MMOL/L (ref 135–146)
T PALLIDUM AB SER QL IA: NORMAL
T VAGINALIS RRNA SPEC QL NAA+PROBE: NOT DETECTED
TSH SERPL-ACNC: 1.53 MIU/L (ref 0.4–4.5)
WBC # BLD AUTO: 3.7 THOUSAND/UL (ref 3.8–10.8)

## 2025-03-25 ENCOUNTER — APPOINTMENT (OUTPATIENT)
Dept: PRIMARY CARE | Facility: CLINIC | Age: 25
End: 2025-03-25
Payer: COMMERCIAL

## 2025-03-27 DIAGNOSIS — E55.9 VITAMIN D DEFICIENCY: Primary | ICD-10-CM

## 2025-03-27 RX ORDER — ERGOCALCIFEROL 1.25 MG/1
50000 CAPSULE ORAL WEEKLY
Qty: 12 CAPSULE | Refills: 0 | Status: SHIPPED | OUTPATIENT
Start: 2025-03-27 | End: 2025-06-19
